# Patient Record
Sex: MALE | Race: WHITE | NOT HISPANIC OR LATINO | Employment: STUDENT | URBAN - METROPOLITAN AREA
[De-identification: names, ages, dates, MRNs, and addresses within clinical notes are randomized per-mention and may not be internally consistent; named-entity substitution may affect disease eponyms.]

---

## 2022-08-02 ENCOUNTER — HOSPITAL ENCOUNTER (EMERGENCY)
Facility: CLINIC | Age: 22
Discharge: HOME | End: 2022-08-02
Attending: EMERGENCY MEDICINE | Admitting: EMERGENCY MEDICINE
Payer: OTHER MISCELLANEOUS

## 2022-08-02 VITALS
HEART RATE: 53 BPM | OXYGEN SATURATION: 100 % | SYSTOLIC BLOOD PRESSURE: 137 MMHG | RESPIRATION RATE: 16 BRPM | TEMPERATURE: 98.3 F | DIASTOLIC BLOOD PRESSURE: 80 MMHG

## 2022-08-02 DIAGNOSIS — S01.81XA CHIN LACERATION, INITIAL ENCOUNTER: ICD-10-CM

## 2022-08-02 PROCEDURE — 99283 EMERGENCY DEPT VISIT LOW MDM: CPT | Mod: 25

## 2022-08-02 PROCEDURE — 90715 TDAP VACCINE 7 YRS/> IM: CPT | Performed by: EMERGENCY MEDICINE

## 2022-08-02 PROCEDURE — 12011 RPR F/E/E/N/L/M 2.5 CM/<: CPT

## 2022-08-02 PROCEDURE — 90471 IMMUNIZATION ADMIN: CPT | Performed by: EMERGENCY MEDICINE

## 2022-08-02 PROCEDURE — 250N000011 HC RX IP 250 OP 636: Performed by: EMERGENCY MEDICINE

## 2022-08-02 PROCEDURE — 250N000009 HC RX 250: Performed by: EMERGENCY MEDICINE

## 2022-08-02 RX ADMIN — CLOSTRIDIUM TETANI TOXOID ANTIGEN (FORMALDEHYDE INACTIVATED), CORYNEBACTERIUM DIPHTHERIAE TOXOID ANTIGEN (FORMALDEHYDE INACTIVATED), BORDETELLA PERTUSSIS TOXOID ANTIGEN (GLUTARALDEHYDE INACTIVATED), BORDETELLA PERTUSSIS FILAMENTOUS HEMAGGLUTININ ANTIGEN (FORMALDEHYDE INACTIVATED), BORDETELLA PERTUSSIS PERTACTIN ANTIGEN, AND BORDETELLA PERTUSSIS FIMBRIAE 2/3 ANTIGEN 0.5 ML: 5; 2; 2.5; 5; 3; 5 INJECTION, SUSPENSION INTRAMUSCULAR at 14:22

## 2022-08-02 RX ADMIN — Medication 3 ML: at 12:14

## 2022-08-02 ASSESSMENT — ENCOUNTER SYMPTOMS
VOMITING: 0
WOUND: 1

## 2022-08-02 NOTE — ED NOTES
Wound care/cleansing on chin laceration was completed by writer prior to suture placement. After suture placement, bacitracin and band-aid was applied to wound. Wound care instructions taught to pt prior to discharge.

## 2022-08-02 NOTE — ED PROVIDER NOTES
History   Chief Complaint:  Laceration       The history is provided by the patient.      Ricky Amaro is a healthy 22 year old male who presents with a laceration. Just prior to arrival he was ducking under a branch while mowing the lawn and hit his chin on the mower handle, sustaining a laceration. He denies pain, vomiting, vision change, or loss of consciousness.     Review of Systems   Constitutional: Negative for activity change.   HENT: Negative for facial swelling.    Eyes: Negative for visual disturbance.   Gastrointestinal: Negative for vomiting.   Skin: Positive for wound (chin laceration).   Neurological: Negative for syncope.   All other systems reviewed and are negative.    Allergies:  The patient has no known allergies.     Medications:  None    Past Medical History:     Patient denies past medical history.      Social History:  Patient is unaccompanied.   Patient arrived via a private vehicle.     Physical Exam     Patient Vitals for the past 24 hrs:   BP Temp Temp src Pulse Resp SpO2   08/02/22 1210 137/80 98.3  F (36.8  C) Temporal 53 16 100 %       Physical Exam   General: WD/WN; well appearing young  man; cooperative  Head:  There is a 1.5 cm linear laceration on the inferior chin, hemostatic.  Eyes:  Conjunctivae, lids, and sclerae are normal  Neck:  Supple; normal ROM  Resp:  No respiratory distress  GI:  Nondistended    MS:  Normal ROM  Skin:  Warm; non-diaphoretic; no pallor; chin laceration as above  Neuro:  Awake; A&Ox3  Psych: Normal mood and affect; normal speech  Vitals reviewed.  Emergency Department Course     Procedures    Laceration Repair      Performed by SURYA Jackson student, with my supervision.     Procedure: Laceration Repair    Indication: Laceration    Consent: Verbal    Location:  Face , chin    Length: 1.5 cm    Preparation: Irrigation with Sterile Saline.    Anesthesia/Sedation: Topical -LET      Treatment/Exploration: Wound explored, no foreign bodies  found     Closure: The wound was closed with one layer. Skin/superficial layer was closed with 3 x 6-0 Nylon using Interrupted sutures.     Patient Status: The patient tolerated the procedure well: Yes. There were no complications.    Emergency Department Course:  Reviewed:  I reviewed nursing notes, vitals, and past medical history.    Assessments:  1340 I obtained history and examined the patient as noted above.   1355 I rechecked the patient and explained findings.     Interventions:  1214 lido-EPINEPHrine-tetracaine (LET) topical gel GEL  1422 Tdap (tetanus-diphtheria-acell pertussis) (ADACEL) injection 0.5 mL    Disposition:  The patient was discharged to home.     Impression & Plan   Medical Decision Making:  Ricky is a 22 year old man who accidentally hit his chin on a lawnmower handle when he was ducking under a branch and sustained a laceration.  He has no other symptoms or concerns.  His tetanus was updated.  His wound was cleaned and repaired, as above, which he tolerated well.  He was counseled on wound care, suture removal, and indications to return.  All of his questions were answered he verbalized understanding.  Amenable to discharge.    Diagnosis:    ICD-10-CM    1. Chin laceration, initial encounter  S01.81XA      Scribe Disclosure:  I, Elijah Welsh, am serving as a scribe at 1:39 PM on 8/2/2022 to document services personally performed by Roma Bass MD based on my observations and the provider's statements to me.        Roma Bass MD  08/05/22 [id number]

## 2022-08-02 NOTE — DISCHARGE INSTRUCTIONS
Keep wound clean and dry. May shower in 24 hours and pat dry. Do not keep submerged.  Follow up with primary care with sutures/staples to be removed in 5 days.  Return with signs of infection such as redness, discharge, or fever >101F, or if you have any other new or concerning symptoms.

## 2022-08-02 NOTE — ED TRIAGE NOTES
Pt here with chin laceration after hitting his chin on the handles of  while ducking a branch. No active bleeding. ABC intact.

## 2022-08-05 ASSESSMENT — ENCOUNTER SYMPTOMS
FACIAL SWELLING: 0
ACTIVITY CHANGE: 0